# Patient Record
Sex: MALE | Race: WHITE
[De-identification: names, ages, dates, MRNs, and addresses within clinical notes are randomized per-mention and may not be internally consistent; named-entity substitution may affect disease eponyms.]

---

## 2018-08-24 ENCOUNTER — HOSPITAL ENCOUNTER (EMERGENCY)
Dept: HOSPITAL 25 - UCEAST | Age: 53
Discharge: HOME | End: 2018-08-24
Payer: COMMERCIAL

## 2018-08-24 VITALS — DIASTOLIC BLOOD PRESSURE: 94 MMHG | SYSTOLIC BLOOD PRESSURE: 141 MMHG

## 2018-08-24 DIAGNOSIS — C81.90: ICD-10-CM

## 2018-08-24 DIAGNOSIS — Z88.1: ICD-10-CM

## 2018-08-24 DIAGNOSIS — Z88.0: ICD-10-CM

## 2018-08-24 DIAGNOSIS — J20.9: Primary | ICD-10-CM

## 2018-08-24 DIAGNOSIS — Z87.891: ICD-10-CM

## 2018-08-24 PROCEDURE — 99212 OFFICE O/P EST SF 10 MIN: CPT

## 2018-08-24 PROCEDURE — G0463 HOSPITAL OUTPT CLINIC VISIT: HCPCS

## 2018-08-24 NOTE — UC
Respiratory Complaint HPI





- HPI Summary


HPI Summary: 


2 DAYS OF PRODUCTIVE COUGH, NASAL DISCHARGE, SUBJECTIVE FEVER AND DIZZINESS.  

HAS BEEN VERY FATIGUED.  NO NAUSEA/VOMITING.  NO EAR PAIN.  STATES HE GETS 

PNEUMONIA EASILY.





- History of Current Complaint


Chief Complaint: UCRespiratory


Stated Complaint: RESP COMPLAINT


Time Seen by Provider: 08/24/18 19:54


Hx Obtained From: Patient, Family/Caretaker - WIFE


Onset/Duration: Gradual Onset, Lasting Days, Still Present


Timing: Constant


Severity Initially: Moderate


Severity Currently: Moderate


Pain Intensity: 0


Pain Scale Used: 0-10 Numeric


Character: Cough: Productive


Aggravating Factors: Nothing


Alleviating Factors: Nothing


Associated Signs And Symptoms: Positive: Fever, Wheezing, URI, Nasal 

Congestion.  Negative: Dyspnea





- Allergies/Home Medications


Allergies/Adverse Reactions: 


 Allergies











Allergy/AdvReac Type Severity Reaction Status Date / Time


 


amoxicillin AdvReac  Nausea And Verified 08/24/18 19:35





   Vomiting  


 


doxycycline AdvReac  Unknown Verified 08/24/18 19:35





   Reaction  





   Details  











Home Medications: 


 Home Medications





Omeprazole CAP* [Prilosec CAP* 20 MG] 20 mg PO BEDTIME 08/24/18 [History 

Confirmed 08/24/18]











PMH/Surg Hx/FS Hx/Imm Hx


Endocrine History: Hypothyroidism


Respiratory History: Asthma


Other Cancer History: HODGKINS LYMPHOMA





- Surgical History


Surgical History: Yes


Surgery Procedure, Year, and Place: Rt hand, reconstruction.  Left knee 

arthroscopy.  rt chest biopsy.  spinal fusion for stenosis





- Family History


Known Family History: 


   Negative: Hypertension





- Social History


Alcohol Use: None


Substance Use Type: None


Smoking Status (MU): Former Smoker





Review of Systems


Constitutional: Fever, Fatigue


Respiratory: Cough


Cardiovascular: Negative


Gastrointestinal: Negative


All Other Systems Reviewed And Are Negative: Yes





Physical Exam


Triage Information Reviewed: Yes


Appearance: Well-Appearing, No Pain Distress, Well-Nourished


Vital Signs: 


 Initial Vital Signs











Temp  98.6 F   08/24/18 19:28


 


Pulse  95   08/24/18 19:28


 


Resp  16   08/24/18 19:28


 


BP  141/94   08/24/18 19:28


 


Pulse Ox  100   08/24/18 19:28











Vital Signs Reviewed: Yes


Eyes: Positive: Conjunctiva Clear


ENT: Positive: Hearing grossly normal, Pharynx normal, TMs normal


Neck: Positive: Supple, Nontender, No Lymphadenopathy


Respiratory: Positive: No respiratory distress, No accessory muscle use, Other: 

- OCCASIONAL COARSE BREATH SOUND.  Negative: Decreased breath sounds, Crackles, 

Rhonchi, Wheezing


Cardiovascular Exam: Normal


Abdomen Description: Positive: Soft


Musculoskeletal: Positive: No Edema


Neurological: Positive: Alert


Psychological: Positive: Normal Response To Family, Age Appropriate Behavior


Skin: Negative: rashes





UC Diagnostic Evaluation





- Laboratory


O2 Sat by Pulse Oximetry: 100





Respiratory Course/Dx





- Differential Dx/Diagnosis


Provider Diagnoses: ACUTE BRONCHITIS





Discharge





- Sign-Out/Discharge


Documenting (check all that apply): Patient Departure


All imaging exams completed and their final reports reviewed: No Studies





- Discharge Plan


Condition: Stable


Disposition: HOME


Prescriptions: 


Azithromycin 500 mg PO DAILY #5 tab


Patient Education Materials:  Acute Bronchitis (ED)


Referrals: 


Magan Chang MD [Primary Care Provider] - If Needed


Additional Instructions: 


YOUR SYMPTOMS ARE LIKELY VIRALLY MEDIATED AND SHOULD RESOLVE ON THEIR OWN WITH 

TIME. IF YOU DO NOT START TO IMPROVE OVER THE NEXT FEW DAYS GO AHEAD AND FILL 

RX FOR ANTIBIOTICS. REST, HYDRATE, OTC MEDS AS NEEDED.  SEEK FOLLOW-UP IF YOU 

ARE NOT IMPROVING OVER THE NEXT 1-2 WEEKS.





YOUR BLOOD PRESSURE WAS ELEVATED TODAY (141/94). THIS MAY BE DUE TO YOUR ACUTE 

CONDITION. MONITOR AND FOLLOW-UP WITH YOUR PCP WITHIN 4 WEEKS IF IT HAS NOT 

RETURNED TO NORMAL.





- Billing Disposition and Condition


Condition: STABLE


Disposition: Home

## 2018-12-13 ENCOUNTER — HOSPITAL ENCOUNTER (EMERGENCY)
Dept: HOSPITAL 25 - ED | Age: 53
Discharge: LEFT BEFORE DISCHARGE | End: 2018-12-13
Payer: COMMERCIAL

## 2018-12-13 VITALS — DIASTOLIC BLOOD PRESSURE: 71 MMHG | SYSTOLIC BLOOD PRESSURE: 144 MMHG

## 2018-12-13 DIAGNOSIS — Z53.21: ICD-10-CM

## 2018-12-13 DIAGNOSIS — Z76.0: Primary | ICD-10-CM

## 2018-12-13 NOTE — XMS REPORT
Jann Martinez

 Created on:2018



Patient:Jann Martinez

Sex:Male

:1965

External Reference #:[id number]





Demographics







 Address  [address] [place] [address]

 

 Home Phone  [phone]

 

 Mobile Phone  [phone]

 

 Email Address  [email or web address]

 

 Preferred Language  English

 

 Marital Status  Not  Or 

 

 Buddhist Affiliation  Unknown

 

 Race  White

 

 Ethnic Group  Not  Or 









Author







 Organization  [facility]

 

 Address  [address] [place] [address]

 

 Phone  [phone]









Support







 Name  Relationship  Address  Phone

 

 Flavia Mcbride  Significant Other  Unavailable  [phone]









Care Team Providers







 Name  Role  Phone

 

 Magan Chang MD  Care Team Information   Unavailable

 

 Magan Chang MD  Primary Care Physician  Unavailable









Payers







 Type  Date  Identification Numbers  Payment Provider  Subscriber

 

 [employer]  Effective:  Policy Number: [id number]  [employer]/BS [employer]  Jann Martinez



   2017      









 Group Number: [id number]  [address] [address]

 

 PayID: [id number]  [place] [address]







Problems







 Description

 

 No Information







Family History







 Date  Family Member(s)  Problem(s)  Comments

 

   Mother  No Current Problems  







Social History







 Type  Date  Description  Comments

 

 Smoking    Patient is a former smoker  







Allergies, Adverse Reactions, Alerts







 Date  Description  Reaction  Status  Severity  Comments

 

 2018  Amoxicillin  GI issues  active    

 

 2018  Doxycycline  severe blisters  active    







Medications







 Medication  Date  Status  Form  Strength  Qnty  SIG  Indications  Ordering



                 Provider

 

 Levothyroxine  /  Active  Tablets  75mcg  30tabs  1 by    Magan HANKS



 Sodium  0000          mouth    Charlene



             every day    , MD

 

 Hydrocodone-Ace  /  Active  Tablets  7.5-325mg    1 by    Unknown



 taminophen  0000          mouth    



             every 6    



             hours as    



             needed    

 

 Prilosec OTC  /  Active  Tablets DR  20mg    1 by    Unknown



   0000          mouth    



             every day    

 

 Ibuprofen  /  Active  Tablets  600mg    1 tab by    Unknown



   0000          mouth    



             every 8    



             hours as    



             needed    



             pain.    



             take with    



             food    

 

 Gabapentin  /  Active  Capsules  100mg    1cap at    Unknown



   0000          lunch    



             ,2caps at    



             night    

 

                 

 

 Carafate  /  Hx  Tablets  1gm    take 1    Unknown



   0000 -          tablet by    



   /          mouth 4    



   2018          times per    



             day    



             before    



             meals and    



             at    



             bedtime    







Vital Signs







 Date  Vital  Result  Comment

 

 2018  BP Systolic  122 mmHg  









 BP Diastolic  66 mmHg  

 

 Heart Rate  84 /min  

 

 Body Temperature  97.1 F  

 

 Respiratory Rate  17 /min  

 

 Height  73 inches  6'1"

 

 Weight  192.00 lb  

 

 BMI (Body Mass Index)  25.3 kg/m2  









 2018  BP Systolic  128 mmHg  









 BP Diastolic  72 mmHg  

 

 Heart Rate  94 /min  

 

 Body Temperature  98.1 F  

 

 Respiratory Rate  16 /min  

 

 Height  73 inches  6'1"

 

 Weight  191.00 lb  

 

 BMI (Body Mass Index)  25.2 kg/m2  

 

 Right Visual Acuity Distance  20/20  

 

 Left Visual Acuity Distance  20/20  







Results







 Test  Date  Test  Result  H/L  Range  Note

 

 Comprehensive Metabolic Prof  2018  Sodium  141 mEq/L    134-149  









 Potassium  3.8 mEq/L    3.6-5.5  

 

 Chloride  100 mEq/L      

 

 Carbon Dioxide  27 mEq/L    21-32  

 

 Glucose  100 mg/dL      

 

 BUN  12 mg/dL    6-26  

 

 Creatinine  1.0 mg/dL    0.6-1.4  

 

 BUN/Creat Ratio  12.0 CALC    8.0-36.0  

 

 Calcium  9.2 mg/dL    8.6-10.2  

 

 Total Protein  6.7 g/dL    6.4-8.3  

 

 Albumin  4.4 g/dL    3.8-5.5  

 

 Globulin  2.3 g/dL    2.0-4.8  

 

 A/G Ratio  1.9 CALC    0.6-2.3  

 

 Alk. Phosphatase  69 U/L    22-95  

 

 Alt (SGPT)  14 U/L    7-35  

 

 Ast (Sgot)  17 U/L    5-34  

 

 Total Bilirubin  0.6 mg/dL    0.2-1.3  

 

 GFR Non-African American  >60 ml/min/1.73m^    >=60  

 

 GFR African American  >60 ml/min/1.73m^    >=60  









 Lipid Profile  2018  Cholesterol  240 mg/dL  High  120-200  









 Triglycerides  190 mg/dL      

 

 HDL Cholesterol  40 mg/dL    30-70  

 

 LDL (Calculated)  162 CALC  High  0-129  

 

 VLDL Cholesterol  38 mg/dL    0-50  

 

 HDL Risk Factor  6.0 CALC  High  0.0-4.4  









 Laboratory test finding  2018  TSH  0.99 mIU/L    0.50-6.00  

 

 CBC Manual Diff-Fma  2018  WBC  6.38    4.0-10.0  









 RBC  4.38    3.93-6.0  

 

 Hemoglobin (Fma/CMC/CTX)  12.2 g/dL    12.0-17.0  

 

 Hematocrit (Fma/CMC/CTX)  34.5 %  Low  35.0-50.0  

 

 Mean Corpuscular Vol  78.8 fL  Low  80-95  

 

 Mean Corpuscular Hemoglobin  27.9 pg    25.6-32.2  

 

 Mean Corpuscular Hemo Concen  35.4 g/dL    32.2-36.0  

 

 Platelets  322 10^3/ul    163-400  

 

 RDW  12.9    11.6-13.7  

 

 Mean Platelet Volume  9.3 fL  Low  9.4-12.4  

 

 Neutrophil  57      

 

 Band  3      

 

 Lymphocytes  28      

 

 Monocyte  7      

 

 Eosinophils  4      

 

 Atypical Lymph  1      

 

 Microcytosis (Fma/CMC/Centrex)  slight      

 

 Z#Comment  plt normal      







Procedures







 Date  CPT Code  Description  Status

 

 2018  71144  Vision Test- screening test of visual acuity,  Completed



     quantitative, bila  







Encounters







 Type  Date  Location  Provider  CPT E/M  Dx

 

 Office Visit  2018  1:30p  Main Office  Magan Chang MD  [address]  
Z00.00









 E89.0

 

 I35.8







Plan of Care

2018 - Magan Chang MDI35.2 Nonrheumatic aortic (valve) stenosis 
with insufficiencyAllComments:~B_~U_Medication Management~b_~u_ Patient 
Understands medications he's taking?     Yes    No  Are there Barriers to 
Adherence?    Yes    No  Has the patient been asked about herbal supplements 
and therapies, and OTC meds?      Yes    No